# Patient Record
Sex: FEMALE | Race: WHITE | NOT HISPANIC OR LATINO
[De-identification: names, ages, dates, MRNs, and addresses within clinical notes are randomized per-mention and may not be internally consistent; named-entity substitution may affect disease eponyms.]

---

## 2017-07-31 PROBLEM — Z00.00 ENCOUNTER FOR PREVENTIVE HEALTH EXAMINATION: Status: ACTIVE | Noted: 2017-07-31

## 2017-08-01 ENCOUNTER — APPOINTMENT (OUTPATIENT)
Dept: INTERNAL MEDICINE | Facility: CLINIC | Age: 62
End: 2017-08-01
Payer: COMMERCIAL

## 2017-08-01 PROCEDURE — 99203 OFFICE O/P NEW LOW 30 MIN: CPT

## 2017-08-08 ENCOUNTER — APPOINTMENT (OUTPATIENT)
Dept: INTERNAL MEDICINE | Facility: CLINIC | Age: 62
End: 2017-08-08
Payer: COMMERCIAL

## 2017-08-08 PROCEDURE — 99215 OFFICE O/P EST HI 40 MIN: CPT | Mod: 25

## 2017-08-08 PROCEDURE — 93000 ELECTROCARDIOGRAM COMPLETE: CPT

## 2019-02-11 ENCOUNTER — EMERGENCY (EMERGENCY)
Facility: HOSPITAL | Age: 64
LOS: 1 days | Discharge: HOME | End: 2019-02-11
Attending: EMERGENCY MEDICINE
Payer: COMMERCIAL

## 2019-02-11 VITALS
WEIGHT: 164.91 LBS | RESPIRATION RATE: 18 BRPM | TEMPERATURE: 98 F | HEART RATE: 96 BPM | HEIGHT: 64 IN | DIASTOLIC BLOOD PRESSURE: 113 MMHG | OXYGEN SATURATION: 99 % | SYSTOLIC BLOOD PRESSURE: 199 MMHG

## 2019-02-11 VITALS
HEART RATE: 86 BPM | RESPIRATION RATE: 17 BRPM | DIASTOLIC BLOOD PRESSURE: 91 MMHG | OXYGEN SATURATION: 98 % | TEMPERATURE: 98 F | SYSTOLIC BLOOD PRESSURE: 189 MMHG

## 2019-02-11 PROCEDURE — 73140 X-RAY EXAM OF FINGER(S): CPT

## 2019-02-11 PROCEDURE — 12001 RPR S/N/AX/GEN/TRNK 2.5CM/<: CPT

## 2019-02-11 PROCEDURE — 99283 EMERGENCY DEPT VISIT LOW MDM: CPT

## 2019-02-11 PROCEDURE — 99285 EMERGENCY DEPT VISIT HI MDM: CPT | Mod: 25

## 2019-02-11 PROCEDURE — 12041 INTMD RPR N-HF/GENIT 2.5CM/<: CPT

## 2019-02-11 PROCEDURE — 26765 TREAT FINGER FRACTURE EACH: CPT | Mod: F4

## 2019-02-11 PROCEDURE — 26750 TREAT FINGER FRACTURE EACH: CPT

## 2019-02-11 PROCEDURE — 73140 X-RAY EXAM OF FINGER(S): CPT | Mod: 26,LT

## 2019-02-11 RX ORDER — OXYCODONE AND ACETAMINOPHEN 5; 325 MG/1; MG/1
1 TABLET ORAL ONCE
Qty: 0 | Refills: 0 | Status: DISCONTINUED | OUTPATIENT
Start: 2019-02-11 | End: 2019-02-11

## 2019-02-11 RX ORDER — LOSARTAN POTASSIUM 100 MG/1
0 TABLET, FILM COATED ORAL
Qty: 0 | Refills: 0 | COMMUNITY

## 2019-02-11 RX ORDER — IBUPROFEN 200 MG
1 TABLET ORAL
Qty: 30 | Refills: 0 | OUTPATIENT
Start: 2019-02-11

## 2019-02-11 RX ADMIN — OXYCODONE AND ACETAMINOPHEN 1 TABLET(S): 5; 325 TABLET ORAL at 14:03

## 2019-02-11 RX ADMIN — Medication 1 TABLET(S): at 14:02

## 2019-02-11 RX ADMIN — OXYCODONE AND ACETAMINOPHEN 1 TABLET(S): 5; 325 TABLET ORAL at 14:02

## 2019-02-11 NOTE — ED PROVIDER NOTE - OBJECTIVE STATEMENT
62 y/o F pt with hx of HTN and vaccinations UTD presents to the ED c/o dog bite of the left hand 5th  finger today by a Chinese joel. 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 Pt states that the dog lunged at her and bit her hand and she tried to pull away sustaining the laceration, and the owner tried to pull the dog away. The dog is fully vaccinated. She states that she cleaned the area with cold water, soft soap, and puricel. Associated with pain and bleeding from site. Pain is rated as 9/10 in severity. Denies deformity of the finger. No other complaints at this time. no pmd. 62 y/o F pt with hx of HTN and vaccinations UTD including tetanus, presents to the ED c/o dog bite of the left hand 5th  finger today by a Thai joel. 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 Pt states that the dog lunged at her and bit her hand and she tried to pull away sustaining the laceration, and the owner tried to pull the dog away. The dog is fully vaccinated. She states that she cleaned the area with cold water, soft soap, and puricel. Associated with pain and bleeding from site. Pain is rated as 9/10 in severity.  No other complaints at this time. no pmd.

## 2019-02-11 NOTE — ED PROVIDER NOTE - NSFOLLOWUPINSTRUCTIONS_ED_ALL_ED_FT
AUGMENTIN 1 PILL TWICE DAILY  IBUPROFEN FOR PAIN  FOR SEVERE PAIN PERCOCET WITH CAUTION!!  FOLLOW UP WITH HAND SURGEON  WOUND CARE AS INSTRUCTED KEEPING HAND ELEVATED MONITORING FOR INFECTION

## 2019-02-11 NOTE — ED PROVIDER NOTE - CARE PROVIDER_API CALL
Rey Arizmendi)  Plastic Surgery  [address] [place] [address]  Phone: [phone]  Fax: [phone]  Follow Up Time:

## 2019-02-11 NOTE — ED PROVIDER NOTE - SKIN [+], MLM
dog bite and laceration, pain, and bleeding at the site LACERATION/dog bite and laceration, pain, and bleeding at the site

## 2019-02-11 NOTE — ED PROVIDER NOTE - SKIN AREA #1
palmar/y-shaped laceration on the distal palmar aspect of the left 5th finger measuring approximately 2.5cm.

## 2019-02-11 NOTE — ED PROVIDER NOTE - CONSTITUTIONAL, MLM
normal... Well appearing, well nourished, awake, alert, oriented to person, place, time/situation and comfortable. holding left hand in right hand.

## 2019-02-11 NOTE — ED ADULT NURSE NOTE - OBJECTIVE STATEMENT
Pt presents stating he was bitten by a dog prior to arrival in vet's office. States animal is up to date with immunizations. dog bite noted left fifth DIP joint cheng surface. Bleeding mildly. unable to flex left fifth distal phlange. Multiple tiny abrasions noted

## 2019-02-11 NOTE — PROCEDURE NOTE - ADDITIONAL PROCEDURE DETAILS
crushed , devitalized tissue debrided open wound loosely approximated with 5-0 plain gut sutures  Sterile dressing applied and volar splint applied.

## 2019-02-11 NOTE — ED PROVIDER NOTE - PROGRESS NOTE DETAILS
discussed with dr. lennon ([facility]) and will see pt in ED. wound closed and cared for by hand- to go home with 10 days augmentin

## 2019-02-11 NOTE — ED PROVIDER NOTE - UPPER EXTREMITY EXAM, LEFT
pain on flexion of the left 5th finger consistent with flexor profundus tendon injury, across the DIP joint in the 5th finger. no crepitus or rotational deformity. no other injuries noted.

## 2019-02-11 NOTE — ED PROVIDER NOTE - CLINICAL SUMMARY MEDICAL DECISION MAKING FREE TEXT BOX
pt presents with dog bite from work, has exam consistent with possible fx vs tendon injury, xray pain mgt abx consult hand for eval and care

## 2019-08-14 ENCOUNTER — RESULT REVIEW (OUTPATIENT)
Age: 64
End: 2019-08-14

## 2019-11-04 NOTE — ED ADULT NURSE NOTE - NURSING SKIN WOUND LATERALITY #1
Patient is a 89y old  Female who presents with a chief complaint of sp fall unable to ambulate (03 Nov 2019 10:05)      SUBJECTIVE / OVERNIGHT EVENTS:    Patient seen and examined.   poor historian. states she does not know what is going on.     Vital Signs Last 24 Hrs  T(C): 36.9 (04 Nov 2019 08:33), Max: 36.9 (04 Nov 2019 08:33)  T(F): 98.5 (04 Nov 2019 08:33), Max: 98.5 (04 Nov 2019 08:33)  HR: 50 (04 Nov 2019 08:33) (50 - 80)  BP: 128/63 (04 Nov 2019 08:33) (110/59 - 128/63)  BP(mean): --  RR: 17 (04 Nov 2019 08:33) (17 - 18)  SpO2: 100% (04 Nov 2019 08:33) (97% - 100%)  I&O's Summary    03 Nov 2019 07:01  -  04 Nov 2019 07:00  --------------------------------------------------------  IN: 720 mL / OUT: 550 mL / NET: 170 mL        PE:  GENERAL: NAD, AAOx2, frail  HEAD:  Atraumatic, Normocephalic  EYES: EOMI, PERRLA, conjunctiva and sclera clear  NECK: Supple, No JVD  CHEST/LUNG: CTABL, No wheeze  HEART: Regular rate and rhythm; no murmur  ABDOMEN: Soft, Nontender, distended; Bowel sounds present  gu: macedo   EXTREMITIES:  2+ Peripheral Pulses, +1 le edema  SKIN: No rashes or lesions  NEURO: No focal deficits    LABS:                        13.0   17.73 )-----------( 304      ( 04 Nov 2019 08:53 )             40.9     11-03    138  |  107  |  55<H>  ----------------------------<  200<H>  4.1   |  21<L>  |  1.23    Ca    9.2      03 Nov 2019 08:37        CAPILLARY BLOOD GLUCOSE      POCT Blood Glucose.: 160 mg/dL (04 Nov 2019 08:17)  POCT Blood Glucose.: 184 mg/dL (03 Nov 2019 21:46)  POCT Blood Glucose.: 177 mg/dL (03 Nov 2019 18:07)  POCT Blood Glucose.: 202 mg/dL (03 Nov 2019 12:27)            RADIOLOGY & ADDITIONAL TESTS:    Imaging Personally Reviewed:  [x] YES  [ ] NO    Consultant(s) Notes Reviewed:  [x] YES  [ ] NO    MEDICATIONS  (STANDING):  ascorbic acid 500 milliGRAM(s) Oral daily  chlorhexidine 4% Liquid 1 Application(s) Topical daily  dextrose 5%. 1000 milliLiter(s) (50 mL/Hr) IV Continuous <Continuous>  dextrose 50% Injectable 12.5 Gram(s) IV Push once  dextrose 50% Injectable 25 Gram(s) IV Push once  dextrose 50% Injectable 25 Gram(s) IV Push once  digoxin     Tablet 0.125 milliGRAM(s) Oral daily  heparin  Injectable 5000 Unit(s) SubCutaneous every 12 hours  insulin lispro (HumaLOG) corrective regimen sliding scale   SubCutaneous three times a day before meals  insulin lispro (HumaLOG) corrective regimen sliding scale   SubCutaneous at bedtime  metoprolol succinate  milliGRAM(s) Oral daily  multivitamin 1 Tablet(s) Oral daily  potassium chloride   Solution 40 milliEquivalent(s) Oral two times a day  senna 2 Tablet(s) Oral at bedtime  simethicone 80 milliGRAM(s) Chew every 6 hours  sodium chloride 0.9%. 1000 milliLiter(s) (100 mL/Hr) IV Continuous <Continuous>    MEDICATIONS  (PRN):  dextrose 40% Gel 15 Gram(s) Oral once PRN Blood Glucose LESS THAN 70 milliGRAM(s)/deciliter  glucagon  Injectable 1 milliGRAM(s) IntraMuscular once PRN Glucose LESS THAN 70 milligrams/deciliter  polyethylene glycol 3350 17 Gram(s) Oral daily PRN Constipation      Care Discussed with Consultants/Other Providers [x] YES  [ ] NO    HEALTH ISSUES - PROBLEM Dx:  Weakness: Weakness  NITA (acute kidney injury): NITA (acute kidney injury)  Abdominal distension: Abdominal distension  Leukocytosis: Leukocytosis left 5th

## 2024-03-26 NOTE — ED PROVIDER NOTE - CARE PLAN
PATCH TEST RESULTS      Dr. Daniels is going to create a detailed patch test report for you this weekend. This will be mailed out on Monday.  It will contain all necessary details to help you avoid these allergens.  Please read over that report, then call or send a patient portal message to Dr. Daniels with any questions you have.    Here is a basic list of what you reacted to:    Gold  Cocamidopropyl  betaine   Dimethylaminopropylamine   Olemidopropyl dimethylamine  Propolis  Cinnamic  Amidoamine       Because if takes up to 8 weeks of avoidance of this type of skin allergen before contact dermatitis clears, we generally recommend you follow-up with your primary dermatologist at least 8 weeks after getting your patch test report.    Principal Discharge DX:	Dog bite, initial encounter  Secondary Diagnosis:	Hypertension Principal Discharge DX:	Dog bite, initial encounter  Secondary Diagnosis:	Hypertension  Secondary Diagnosis:	Open finger fracture